# Patient Record
Sex: MALE | Race: WHITE | URBAN - METROPOLITAN AREA
[De-identification: names, ages, dates, MRNs, and addresses within clinical notes are randomized per-mention and may not be internally consistent; named-entity substitution may affect disease eponyms.]

---

## 2017-02-27 ENCOUNTER — APPOINTMENT (RX ONLY)
Dept: URBAN - METROPOLITAN AREA CLINIC 124 | Facility: CLINIC | Age: 57
Setting detail: DERMATOLOGY
End: 2017-02-27

## 2017-02-27 DIAGNOSIS — L43.8 OTHER LICHEN PLANUS: ICD-10-CM

## 2017-02-27 PROBLEM — J44.9 CHRONIC OBSTRUCTIVE PULMONARY DISEASE, UNSPECIFIED: Status: ACTIVE | Noted: 2017-02-27

## 2017-02-27 PROCEDURE — ? PRESCRIPTION

## 2017-02-27 PROCEDURE — ? TREATMENT REGIMEN

## 2017-02-27 PROCEDURE — 99202 OFFICE O/P NEW SF 15 MIN: CPT

## 2017-02-27 PROCEDURE — ? COUNSELING

## 2017-02-27 RX ORDER — NYSTATIN 100000 [USP'U]/ML
SUSPENSION ORAL
Qty: 500 | Refills: 5 | Status: CANCELLED

## 2017-02-27 RX ORDER — DEXAMETHASONE 0.5 MG/5ML
SOLUTION ORAL
Qty: 500 | Refills: 5 | Status: CANCELLED

## 2017-02-27 ASSESSMENT — LOCATION SIMPLE DESCRIPTION DERM
LOCATION SIMPLE: RIGHT MAXILLARY GINGIVA
LOCATION SIMPLE: LEFT BUCCAL MUCOSA
LOCATION SIMPLE: LEFT VENTRAL TONGUE
LOCATION SIMPLE: LEFT LOWER LABIAL MUCOSA
LOCATION SIMPLE: RIGHT BUCCAL MUCOSA
LOCATION SIMPLE: RIGHT LATERAL TONGUE

## 2017-02-27 ASSESSMENT — LOCATION ZONE DERM
LOCATION ZONE: LIP
LOCATION ZONE: ORAL_CAVITY

## 2017-02-27 ASSESSMENT — LOCATION DETAILED DESCRIPTION DERM
LOCATION DETAILED: LEFT LOWER LABIAL MUCOSA
LOCATION DETAILED: LEFT ANTEROLATERAL VENTRAL TONGUE
LOCATION DETAILED: RIGHT MEDIAL MAXILLARY GINGIVA
LOCATION DETAILED: RIGHT MIDDLE SUPERIOR BUCCAL MUCOSA
LOCATION DETAILED: RIGHT ANTEROLATERAL TONGUE
LOCATION DETAILED: LEFT MIDDLE SUPERIOR BUCCAL MUCOSA

## 2017-02-27 NOTE — HPI: RASH (LICHEN PLANUS)
How Severe Is It?: mild
Is This A New Presentation, Or A Follow-Up?: Rash
Additional History: Pt has had biopsy done showing OLP, He has tried Prednisone and Betamethasone diproprianate ointment which hasn't helped much.

## 2017-02-27 NOTE — PROCEDURE: TREATMENT REGIMEN
Detail Level: Simple
Otc Regimen: Curcumin 1 capsule daily
Continue Regimen: Betamethasone diproprianate ointment bid to AA